# Patient Record
Sex: FEMALE | Race: WHITE | NOT HISPANIC OR LATINO | Employment: UNEMPLOYED | URBAN - NONMETROPOLITAN AREA
[De-identification: names, ages, dates, MRNs, and addresses within clinical notes are randomized per-mention and may not be internally consistent; named-entity substitution may affect disease eponyms.]

---

## 2019-01-18 ENCOUNTER — TRANSCRIBE ORDERS (OUTPATIENT)
Dept: ORTHOPEDIC SURGERY | Facility: CLINIC | Age: 19
End: 2019-01-18

## 2019-01-18 DIAGNOSIS — M41.9 SCOLIOSIS, UNSPECIFIED SCOLIOSIS TYPE, UNSPECIFIED SPINAL REGION: Primary | ICD-10-CM

## 2019-01-28 ENCOUNTER — OFFICE VISIT (OUTPATIENT)
Dept: ORTHOPEDIC SURGERY | Facility: CLINIC | Age: 19
End: 2019-01-28

## 2019-01-28 VITALS — HEIGHT: 63 IN | BODY MASS INDEX: 39.51 KG/M2 | WEIGHT: 223 LBS

## 2019-01-28 DIAGNOSIS — M54.6 BACK PAIN OF THORACOLUMBAR REGION: Primary | ICD-10-CM

## 2019-01-28 DIAGNOSIS — M41.9 SCOLIOSIS, UNSPECIFIED SCOLIOSIS TYPE, UNSPECIFIED SPINAL REGION: Primary | ICD-10-CM

## 2019-01-28 DIAGNOSIS — M54.50 BACK PAIN OF THORACOLUMBAR REGION: Primary | ICD-10-CM

## 2019-01-28 DIAGNOSIS — M41.125 ADOLESCENT IDIOPATHIC SCOLIOSIS OF THORACOLUMBAR REGION: ICD-10-CM

## 2019-01-28 PROCEDURE — 99213 OFFICE O/P EST LOW 20 MIN: CPT | Performed by: ORTHOPAEDIC SURGERY

## 2019-01-28 RX ORDER — ALBUTEROL SULFATE 90 UG/1
AEROSOL, METERED RESPIRATORY (INHALATION)
COMMUNITY
Start: 2016-08-03

## 2019-01-28 RX ORDER — LORATADINE 10 MG/1
CAPSULE, LIQUID FILLED ORAL
COMMUNITY

## 2019-01-28 RX ORDER — IBUPROFEN 800 MG/1
TABLET ORAL
COMMUNITY
Start: 2016-07-29

## 2019-01-28 RX ORDER — ACETAMINOPHEN AND CODEINE PHOSPHATE 300; 30 MG/1; MG/1
TABLET ORAL
COMMUNITY
Start: 2016-07-29

## 2019-01-28 RX ORDER — ONDANSETRON 4 MG/1
4 TABLET, FILM COATED ORAL EVERY 8 HOURS PRN
COMMUNITY

## 2019-01-28 RX ORDER — MONTELUKAST SODIUM 10 MG/1
10 TABLET ORAL NIGHTLY
COMMUNITY

## 2019-01-28 RX ORDER — IRON POLYSACCHARIDE COMPLEX 150 MG
150 CAPSULE ORAL 2 TIMES DAILY
COMMUNITY

## 2019-01-28 RX ORDER — VENLAFAXINE 37.5 MG/1
37.5 TABLET ORAL 2 TIMES DAILY
COMMUNITY

## 2019-01-28 RX ORDER — BUDESONIDE AND FORMOTEROL FUMARATE DIHYDRATE 160; 4.5 UG/1; UG/1
2 AEROSOL RESPIRATORY (INHALATION)
COMMUNITY

## 2019-01-28 RX ORDER — OMEPRAZOLE 20 MG/1
20 CAPSULE, DELAYED RELEASE ORAL DAILY
COMMUNITY

## 2019-01-28 RX ORDER — TIZANIDINE 4 MG/1
4 TABLET ORAL NIGHTLY PRN
COMMUNITY

## 2019-01-28 NOTE — PROGRESS NOTES
Loretta Santana is a 18 y.o. female   Primary provider:  Geremias Foreman MD       Chief Complaint   Patient presents with   • Lumbar Spine - Pain   • Thoracic Spine - Pain     X-rays done today in office    HISTORY OF PRESENT ILLNESS: back pain from scoliosis.  Patient states the pain radiating from tailbone up to shoulder blades.   Pain scale today 4/10    Pain   This is a chronic problem. The current episode started more than 1 year ago. The problem occurs constantly. The problem has been gradually worsening. Associated symptoms include headaches and vomiting. Associated symptoms comments: Stabbing,aching pain . The symptoms are aggravated by walking and standing. She has tried ice (injections) for the symptoms. The treatment provided no relief.     18 y pain of the back present for several months.  The pain is moderate, states she was referred to physical therapy.  Previously treated in a brace as adolescent for scoliosis, now requesting a brace  No fevers, no chills   no nighttime awakening pain.         CONCURRENT MEDICAL HISTORY:    Past Medical History:   Diagnosis Date   • Adolescent idiopathic scoliosis of thoracolumbar region 1/28/2019   • Anemia    • Asthma    • Depression        No Known Allergies      Current Outpatient Medications:   •  acetaminophen-codeine (TYLENOL #3) 300-30 MG per tablet, , Disp: , Rfl:   •  albuterol sulfate HFA (VENTOLIN HFA) 108 (90 Base) MCG/ACT inhaler, , Disp: , Rfl:   •  ibuprofen (ADVIL,MOTRIN) 800 MG tablet, , Disp: , Rfl:   •  iron polysaccharides (NIFEREX) 150 MG capsule, Take 150 mg by mouth 2 (Two) Times a Day., Disp: , Rfl:   •  Lido-Capsaicin-Men-Methyl Sal (1ST MEDX-PATCH/ LIDOCAINE) 4-0.025-5-20 % patch, Apply  topically., Disp: , Rfl:   •  Loratadine 10 MG capsule, Take  by mouth., Disp: , Rfl:   •  metFORMIN (GLUCOPHAGE) 500 MG tablet, Take 500 mg by mouth 2 (Two) Times a Day With Meals., Disp: , Rfl:   •  montelukast (SINGULAIR) 10 MG tablet, Take 10 mg by mouth  Every Night., Disp: , Rfl:   •  Norgestimate-Eth Estradiol (SPRINTEC 28 PO), Take  by mouth., Disp: , Rfl:   •  omeprazole (priLOSEC) 20 MG capsule, Take 20 mg by mouth Daily., Disp: , Rfl:   •  ondansetron (ZOFRAN) 4 MG tablet, Take 4 mg by mouth Every 8 (Eight) Hours As Needed for Nausea or Vomiting., Disp: , Rfl:   •  tiZANidine (ZANAFLEX) 4 MG tablet, Take 4 mg by mouth At Night As Needed for Muscle Spasms., Disp: , Rfl:   •  venlafaxine (EFFEXOR) 37.5 MG tablet, Take 37.5 mg by mouth 2 (Two) Times a Day., Disp: , Rfl:   •  budesonide-formoterol (SYMBICORT) 160-4.5 MCG/ACT inhaler, Inhale 2 puffs., Disp: , Rfl:     No past surgical history on file.    No family history on file.     Social History     Socioeconomic History   • Marital status: Single     Spouse name: Not on file   • Number of children: Not on file   • Years of education: Not on file   • Highest education level: Not on file   Social Needs   • Financial resource strain: Not on file   • Food insecurity - worry: Not on file   • Food insecurity - inability: Not on file   • Transportation needs - medical: Not on file   • Transportation needs - non-medical: Not on file   Occupational History   • Not on file   Tobacco Use   • Smoking status: Never Smoker   • Smokeless tobacco: Never Used   Substance and Sexual Activity   • Alcohol use: No     Frequency: Never   • Drug use: No   • Sexual activity: Defer   Other Topics Concern   • Not on file   Social History Narrative   • Not on file        Review of Systems   Constitutional: Negative.    Eyes: Negative.    Respiratory: Positive for wheezing.    Cardiovascular: Negative.    Gastrointestinal: Positive for vomiting.   Endocrine: Negative.    Genitourinary: Negative.    Musculoskeletal: Positive for back pain.   Skin: Negative.    Allergic/Immunologic: Negative.    Neurological: Positive for dizziness and headaches.   Hematological: Negative.    Psychiatric/Behavioral: Positive for sleep disturbance. The  "patient is nervous/anxious.        PHYSICAL EXAMINATION:       Ht 160 cm (63\")   Wt 101 kg (223 lb)   BMI 39.50 kg/m²     Physical Exam   Constitutional: She is oriented to person, place, and time. She appears well-developed and well-nourished. No distress.   obese   HENT:   Head: Normocephalic.   Right Ear: External ear normal.   Left Ear: External ear normal.   Mouth/Throat: No oropharyngeal exudate.   Eyes: EOM are normal. Pupils are equal, round, and reactive to light. Right eye exhibits no discharge. Left eye exhibits no discharge. No scleral icterus.   Neck: Normal range of motion. No JVD present. No tracheal deviation present. No thyromegaly present.   Cardiovascular: Normal rate and intact distal pulses.   Pulmonary/Chest: Effort normal and breath sounds normal. No respiratory distress. She has no wheezes. She has no rales. She exhibits no tenderness.   Abdominal: Soft. Bowel sounds are normal. She exhibits no distension and no mass. There is no tenderness. There is no guarding.   Musculoskeletal: Normal range of motion. She exhibits no edema or deformity.        Thoracic back: She exhibits normal range of motion, no tenderness, no bony tenderness, no swelling, no edema, no deformity, no laceration, no pain, no spasm and normal pulse.        Lumbar back: Normal. She exhibits normal range of motion, no tenderness, no bony tenderness, no swelling, no edema, no deformity, no laceration, no pain, no spasm and normal pulse.   Lymphadenopathy:     She has no cervical adenopathy.   Neurological: She is alert and oriented to person, place, and time. She has normal reflexes. She displays normal reflexes. No cranial nerve deficit. She exhibits normal muscle tone. Coordination normal.   Skin: Skin is warm and dry. No rash noted. She is not diaphoretic. No erythema.   Psychiatric: She has a normal mood and affect. Her behavior is normal. Thought content normal.       GAIT:     []  Normal  []  Antalgic    Assistive " device: [x]  None  []  Walker     []  Crutches  []  Cane     []  Wheelchair  []  Stretcher    Right Ankle Exam     Muscle Strength   Dorsiflexion:  5/5  Plantar flexion:  5/5  Anterior tibial:  5/5  Posterior tibial:  5/5  Gastrocsoleus:  5/5  Peroneal muscle:  5/5      Left Ankle Exam     Muscle Strength   Dorsiflexion:  5/5   Plantar flexion:  5/5   Anterior tibial:  5/5   Posterior tibial:  5/5  Gastrocsoleus:  5/5  Peroneal muscle:  5/5      Right Hip Exam     Range of Motion   External rotation: normal   Internal rotation: normal     Muscle Strength   Abduction: 5/5   Flexion: 5/5       Left Hip Exam     Range of Motion   External rotation: normal   Internal rotation: normal     Muscle Strength   Abduction: 5/5   Flexion: 5/5       Back Exam     Tenderness   The patient is experiencing tenderness in the lumbar.    Range of Motion   Extension: 30   Flexion:  70 normal   Lateral bend right: 20   Lateral bend left: 20   Rotation right: 30   Rotation left: 30     Muscle Strength   Right Quadriceps:  5/5   Left Quadriceps:  5/5   Right Hamstrings:  5/5   Left Hamstrings:  5/5     Tests   Straight leg raise right: negative  Straight leg raise left: negative    Reflexes   Patellar: 2/4  Achilles: 2/4  Biceps: 2/4  Babinski's sign: normal     Other   Sensation: normal  Gait: normal   Erythema: no back redness  Scars: absent        right thoracic prominence        ASSESSMENT:    Diagnoses and all orders for this visit:    Back pain of thoracolumbar region  -     Back Brace    Adolescent idiopathic scoliosis of thoracolumbar region  -     Back Brace    Other orders  -     budesonide-formoterol (SYMBICORT) 160-4.5 MCG/ACT inhaler; Inhale 2 puffs.  -     albuterol sulfate HFA (VENTOLIN HFA) 108 (90 Base) MCG/ACT inhaler;   -     acetaminophen-codeine (TYLENOL #3) 300-30 MG per tablet;   -     ibuprofen (ADVIL,MOTRIN) 800 MG tablet;   -     Norgestimate-Eth Estradiol (SPRINTEC 28 PO); Take  by mouth.  -     Loratadine 10 MG  capsule; Take  by mouth.  -     montelukast (SINGULAIR) 10 MG tablet; Take 10 mg by mouth Every Night.  -     omeprazole (priLOSEC) 20 MG capsule; Take 20 mg by mouth Daily.  -     venlafaxine (EFFEXOR) 37.5 MG tablet; Take 37.5 mg by mouth 2 (Two) Times a Day.  -     metFORMIN (GLUCOPHAGE) 500 MG tablet; Take 500 mg by mouth 2 (Two) Times a Day With Meals.  -     iron polysaccharides (NIFEREX) 150 MG capsule; Take 150 mg by mouth 2 (Two) Times a Day.  -     tiZANidine (ZANAFLEX) 4 MG tablet; Take 4 mg by mouth At Night As Needed for Muscle Spasms.  -     Lido-Capsaicin-Men-Methyl Sal (1ST MEDX-PATCH/ LIDOCAINE) 4-0.025-5-20 % patch; Apply  topically.  -     ondansetron (ZOFRAN) 4 MG tablet; Take 4 mg by mouth Every 8 (Eight) Hours As Needed for Nausea or Vomiting.          PLAN    Brace ordered lumbar region   I encouraged her to go physical therapy.        Maurice Stephens MD

## 2021-09-13 ENCOUNTER — TELEPHONE (OUTPATIENT)
Dept: CARDIOLOGY CLINIC | Age: 21
End: 2021-09-13

## 2021-09-13 NOTE — TELEPHONE ENCOUNTER
Pt.'s mom called to reschedule a appt. she had for today. Pt. is having trouble with her asthma. Due to covid questions I'm unable to reschedule in office. .     Please be advised that the best time to call her to accommodate their needs is Anytime. Thank you.

## 2021-09-13 NOTE — TELEPHONE ENCOUNTER
Called and left message for patient. If patient calls back to reschedule please send to [phone] to reschedule.

## 2021-09-14 NOTE — TELEPHONE ENCOUNTER
Called and left message for patient. If patient calls back to reschedule please send to [phone] to reschedule.

## 2021-09-15 NOTE — TELEPHONE ENCOUNTER
Called and left message for patient. If patient calls back to reschedule please send to [phone] to reschedule.

## 2023-10-24 ENCOUNTER — OFFICE VISIT (OUTPATIENT)
Dept: CARDIOLOGY CLINIC | Age: 23
End: 2023-10-24
Payer: MEDICAID

## 2023-10-24 VITALS
WEIGHT: 227 LBS | BODY MASS INDEX: 40.22 KG/M2 | HEART RATE: 128 BPM | SYSTOLIC BLOOD PRESSURE: 148 MMHG | HEIGHT: 63 IN | DIASTOLIC BLOOD PRESSURE: 100 MMHG

## 2023-10-24 DIAGNOSIS — R42 DIZZINESS: ICD-10-CM

## 2023-10-24 DIAGNOSIS — R00.2 PALPITATIONS: Primary | ICD-10-CM

## 2023-10-24 DIAGNOSIS — R94.31 ABNORMAL EKG: ICD-10-CM

## 2023-10-24 PROCEDURE — 93000 ELECTROCARDIOGRAM COMPLETE: CPT | Performed by: INTERNAL MEDICINE

## 2023-10-24 PROCEDURE — 99203 OFFICE O/P NEW LOW 30 MIN: CPT | Performed by: INTERNAL MEDICINE

## 2023-10-24 RX ORDER — LEVOCETIRIZINE DIHYDROCHLORIDE 5 MG/1
5 TABLET, FILM COATED ORAL NIGHTLY
COMMUNITY

## 2023-10-24 RX ORDER — FLUOXETINE HYDROCHLORIDE 40 MG/1
40 CAPSULE ORAL DAILY
COMMUNITY

## 2023-10-24 RX ORDER — BUSPIRONE HYDROCHLORIDE 15 MG/1
15 TABLET ORAL 2 TIMES DAILY
COMMUNITY

## 2023-10-24 RX ORDER — MONTELUKAST SODIUM 10 MG/1
10 TABLET ORAL NIGHTLY
COMMUNITY

## 2023-10-24 RX ORDER — METOCLOPRAMIDE 10 MG/1
10 TABLET ORAL DAILY
COMMUNITY

## 2023-10-24 RX ORDER — AMITRIPTYLINE HYDROCHLORIDE 25 MG/1
25 TABLET, FILM COATED ORAL NIGHTLY
COMMUNITY

## 2023-10-24 RX ORDER — LEVOTHYROXINE SODIUM 0.03 MG/1
25 TABLET ORAL DAILY
COMMUNITY

## 2023-10-24 RX ORDER — OMEPRAZOLE 10 MG/1
10 CAPSULE, DELAYED RELEASE ORAL DAILY
COMMUNITY

## 2023-10-24 RX ORDER — IRON POLYSACCHARIDE COMPLEX 150 MG
150 CAPSULE ORAL DAILY
COMMUNITY

## 2023-10-24 RX ORDER — TOPIRAMATE 50 MG/1
50 TABLET, FILM COATED ORAL DAILY
COMMUNITY

## 2023-10-24 ASSESSMENT — ENCOUNTER SYMPTOMS
FACIAL SWELLING: 0
VOMITING: 0
EYE PAIN: 0
CONSTIPATION: 0
ABDOMINAL DISTENTION: 0
NAUSEA: 0
CHEST TIGHTNESS: 0
EYE REDNESS: 0
BLOOD IN STOOL: 0
COUGH: 0
ABDOMINAL PAIN: 0
APNEA: 0
DIARRHEA: 0
SHORTNESS OF BREATH: 0
SORE THROAT: 0
WHEEZING: 0
EYE DISCHARGE: 0

## 2023-10-24 NOTE — PROGRESS NOTES
distension. Palpations: Abdomen is soft. There is no mass. Tenderness: There is no abdominal tenderness. There is no guarding. Musculoskeletal:         General: No swelling. Cervical back: Normal range of motion and neck supple. No rigidity. Right lower leg: No edema. Left lower leg: No edema. Skin:     General: Skin is warm and dry. Coloration: Skin is not jaundiced. Findings: No erythema or rash. Neurological:      General: No focal deficit present. Mental Status: She is alert and oriented to person, place, and time. Mental status is at baseline. Psychiatric:         Mood and Affect: Mood normal.         Behavior: Behavior normal.         Thought Content: Thought content normal.                   Imaging:    - EKG : Sinus tachycardia 109 bpm, RSR pattern in V1, nonspecific ST changes        ASSESSMENT and PLAN:    Constellation of symptoms including palpitations, tachycardia and dizziness, etiology unclear  Sinus tachycardia on EKG  Negative orthostatic vitals  Recommend tilt table test for possibility of postural orthostatic tachycardia syndrome  Obtain 2D echocardiogram with Doppler to exclude significant structural heart disease  Further recommendations will follow                  Electronically signed by Reuben Marinelli MD on 10/24/2023 at 10:58 AM    Reuben Marinelli MD, URMILA, [facility]  Noninvasive Cardiology Consultant    This dictation was generated by voice recognition computer software. Although all attempts are made to edit the dictation for accuracy, there may be errors in the transcription that are not intended.

## 2023-11-02 ENCOUNTER — HOSPITAL ENCOUNTER (OUTPATIENT)
Dept: NON INVASIVE DIAGNOSTICS | Age: 23
Discharge: HOME | End: 2023-11-02
Payer: MEDICAID

## 2023-11-02 DIAGNOSIS — R42 DIZZINESS: ICD-10-CM

## 2023-11-02 DIAGNOSIS — R00.2 PALPITATIONS: ICD-10-CM

## 2023-11-02 DIAGNOSIS — R94.31 ABNORMAL EKG: ICD-10-CM

## 2023-11-02 PROCEDURE — 93660 TILT TABLE EVALUATION: CPT

## 2023-11-02 NOTE — PROCEDURES
[address]      Tilt Table Test Report      Date of Procedure: 11/02/23    Date of Report: 11/02/23      The patient was brought to the [id number] [place] [address],[address]. Heart rate and blood pressure were obtained at rest (horizontal) and then every minute during 45 minutes of heads up tilt table testing at 80 degrees. The patient was then recovered. Results: At rest the heart rate was:  95 bpm and the blood pressure was:  138/78 mmHg    During 43:30 minutes of upright tilt table testing, the heart rate varied between 101 bpm and 131 bpm.    During 43:30 minutes of upright tilt table testing, the blood pressure varied between 132/101 mmHg and 195/59 mmHg. At the completion of the test, the heart rate was:  98 bpm and the blood pressure was:  151/89 mmHg    During the 43:30 minutes of up right tilt table testing, the patient experienced dizziness nausea and warm feeling. Requested pt be put down at 43:30 d/t feeling so nauseated. Complications:  None      Conclusions: The 43:30minutes of heads up right tilt table testing showed:    1. Mild symptoms of pre syncope    2. 65 point variations in blood pressure    3. 30 beat variations in heart rate    4. Pt was  without arrhythmia or pauses. Impression: This is a positive tilt as patient did have symptoms I did blood pressure of 132/101   and heart rate of 116 bpm with more than 65 point variation blood pressure and 30 beat variation in heart rate,   These are significant findings.     Electronically signed by Juwan Stallworth RN on 11/2/23

## 2024-01-24 ENCOUNTER — TELEPHONE (OUTPATIENT)
Dept: CARDIOLOGY CLINIC | Age: 24
End: 2024-01-24

## 2024-01-24 NOTE — TELEPHONE ENCOUNTER
Called and left message for patient letting them know Dr. Mejia will be out of the office. Can reschedule to see IRMA or Dr. Bob next opening.

## 2024-03-08 ENCOUNTER — OFFICE VISIT (OUTPATIENT)
Dept: GASTROENTEROLOGY | Age: 24
End: 2024-03-08
Payer: MEDICAID

## 2024-03-08 VITALS
SYSTOLIC BLOOD PRESSURE: 138 MMHG | WEIGHT: 218 LBS | HEIGHT: 62 IN | DIASTOLIC BLOOD PRESSURE: 84 MMHG | BODY MASS INDEX: 40.12 KG/M2 | HEART RATE: 102 BPM | OXYGEN SATURATION: 97 %

## 2024-03-08 DIAGNOSIS — R11.2 NAUSEA AND VOMITING, UNSPECIFIED VOMITING TYPE: Primary | ICD-10-CM

## 2024-03-08 DIAGNOSIS — R10.13 EPIGASTRIC PAIN: ICD-10-CM

## 2024-03-08 PROCEDURE — 99204 OFFICE O/P NEW MOD 45 MIN: CPT | Performed by: NURSE PRACTITIONER

## 2024-03-08 RX ORDER — PROMETHAZINE HYDROCHLORIDE 25 MG/1
25 TABLET ORAL EVERY 6 HOURS PRN
COMMUNITY
Start: 2024-01-26

## 2024-03-08 RX ORDER — ACETAMINOPHEN AND CODEINE PHOSPHATE 300; 30 MG/1; MG/1
1 TABLET ORAL EVERY 4 HOURS PRN
COMMUNITY
Start: 2016-07-29

## 2024-03-08 RX ORDER — TIZANIDINE 4 MG/1
4 TABLET ORAL NIGHTLY PRN
COMMUNITY

## 2024-03-08 RX ORDER — RIZATRIPTAN BENZOATE 10 MG/1
10 TABLET, ORALLY DISINTEGRATING ORAL
COMMUNITY
Start: 2024-03-04

## 2024-03-08 RX ORDER — ONDANSETRON 4 MG/1
4 TABLET, FILM COATED ORAL EVERY 8 HOURS PRN
COMMUNITY

## 2024-03-08 RX ORDER — ERGOCALCIFEROL 1.25 MG/1
50000 CAPSULE ORAL WEEKLY
COMMUNITY

## 2024-03-08 RX ORDER — BETHANECHOL CHLORIDE 25 MG/1
25 TABLET ORAL 4 TIMES DAILY
Qty: 120 TABLET | Refills: 11 | Status: SHIPPED | OUTPATIENT
Start: 2024-03-08

## 2024-03-08 RX ORDER — POTASSIUM CHLORIDE 750 MG/1
10 TABLET, FILM COATED, EXTENDED RELEASE ORAL DAILY
COMMUNITY
Start: 2024-03-02

## 2024-03-08 RX ORDER — FLUTICASONE PROPIONATE AND SALMETEROL XINAFOATE 115; 21 UG/1; UG/1
2 AEROSOL, METERED RESPIRATORY (INHALATION) 2 TIMES DAILY
COMMUNITY
Start: 2016-08-03

## 2024-03-08 RX ORDER — PROCHLORPERAZINE 25 MG
25 SUPPOSITORY, RECTAL RECTAL EVERY 12 HOURS PRN
COMMUNITY

## 2024-03-08 RX ORDER — IBUPROFEN 800 MG/1
800 TABLET ORAL EVERY 6 HOURS PRN
COMMUNITY
Start: 2016-07-29

## 2024-03-08 RX ORDER — PROCHLORPERAZINE MALEATE 10 MG
10 TABLET ORAL EVERY 6 HOURS PRN
COMMUNITY
Start: 2024-03-05

## 2024-03-08 NOTE — PROGRESS NOTES
Subjective:     Patient ID: Vickie Whitley is a 23 y.o. female  PCP: Hipolito Hart MD  Referring Provider: Rito Hart  Patient presents to the office today with the following complaints: New Patient, Other (Gastroparesis thinks she has ), Nausea, and Emesis      Patient seen in the office today with complaints of nausea, vomiting,   And abd pain that gets worse when she drinks   When she eats she vomits \"almost instantly\"   Weight loss of 25 pounds since December   Diagnosed with POTS in November   Zofran and phenergan do not help her nausea   Compazine helps slightly     She went to the ER Friday at [facility]   CT scan was done     She had these symptoms last year and had her gallbladder removed but it did not help the symptoms    Reports she was prescribed Reglan for her gastroparesis but she does not feel this is helping her, she has not had any gastric emptying studies done   Reports she is having loose stools, but states she is not eating solid foods either     EGD was done 1/2023 at [facility]     Assessment:     1. Nausea and vomiting, unspecified vomiting type  -     NM GASTRIC EMPTYING; Future  -     bethanechol (URECHOLINE) 25 MG tablet; Take 1 tablet by mouth 4 times daily Take 30 minutes before each meal and at bedtime, Disp-120 tablet, R-11Normal  2. Epigastric pain  -     NM GASTRIC EMPTYING; Future  -     bethanechol (URECHOLINE) 25 MG tablet; Take 1 tablet by mouth 4 times daily Take 30 minutes before each meal and at bedtime, Disp-120 tablet, R-11Normal       Review of Systems   Constitutional:  Negative for activity change, appetite change, fatigue, fever and unexpected weight change.   HENT:  Negative for trouble swallowing.    Respiratory:  Negative for cough, choking and shortness of breath.    Cardiovascular:  Negative for chest pain.   Gastrointestinal:  Positive for abdominal pain (epigastric), nausea and vomiting. Negative for abdominal

## 2024-03-12 ASSESSMENT — ENCOUNTER SYMPTOMS
DIARRHEA: 0
ANAL BLEEDING: 0
CHOKING: 0
BLOOD IN STOOL: 0
NAUSEA: 1
ABDOMINAL PAIN: 1
ABDOMINAL DISTENTION: 0
RECTAL PAIN: 0
TROUBLE SWALLOWING: 0
CONSTIPATION: 0
COUGH: 0
VOMITING: 1
SHORTNESS OF BREATH: 0

## 2024-03-20 ENCOUNTER — TELEPHONE (OUTPATIENT)
Dept: GASTROENTEROLOGY | Age: 24
End: 2024-03-20

## 2024-03-20 NOTE — TELEPHONE ENCOUNTER
Per OV 03- CT ARPN     Plan:   Stop Reglan  Start bethanechol   Gastric emptying study   Follow up in 6 weeks        03- Patient called LVM stated that the Dr has her on medication that she is unable to take as that it makes her really sick to eat or drink       Called patient   She has started Bethanechol-since starting it she has nausea & Vomiting (1-2 times daily).        Recommend to send message to CT APRN for recommendations.     Oked per patient

## 2024-03-21 NOTE — TELEPHONE ENCOUNTER
03- Called M for the patient to return a call to the office Cv        03- Pt returned call  Notified of recommendations as per CT APRn

## 2024-03-21 NOTE — TELEPHONE ENCOUNTER
She can stop the bethanechol. I do not have another medication I can give her for gastroparesis  minimum assist (75% patients effort)

## 2024-05-01 ENCOUNTER — TELEPHONE (OUTPATIENT)
Dept: GASTROENTEROLOGY | Age: 24
End: 2024-05-01

## 2024-05-02 NOTE — TELEPHONE ENCOUNTER
5-2-24  NM GASTRIC EMPTYING RECEIVED AND SCANNED IN MEDIA.  SENT TO CT APRN FOR REVIEW AND ADVISE.

## 2024-05-02 NOTE — TELEPHONE ENCOUNTER
WE DO NOT HAVE THAT REPORT.  CALLED [facility] [phone], RESULTS ARE BEING FAXED TO ME.    PT KNOWS ONCE THEY ARE RECEIVED I WILL CALL HER BACK W/RESULTS.  PT VOICED UNDERSTANDING.

## 2024-05-02 NOTE — RESULT ENCOUNTER NOTE
Mild gastroparesis noted, please instruct her to eat small meals and I have already prescribed her medication to help with gastric emptying (Bethanechol)

## 2024-05-03 ENCOUNTER — TELEPHONE (OUTPATIENT)
Dept: GASTROENTEROLOGY | Age: 24
End: 2024-05-03

## 2024-05-03 NOTE — TELEPHONE ENCOUNTER
5-3-24    Thanks Vidal,  pt has been notified of results and recommendations. Pt can not take Bethanechol due to it making her Vomit 2x a day. She is back on Reglan       Results routed to PCP

## 2024-05-03 NOTE — TELEPHONE ENCOUNTER
----- Message from IRMA Vargas sent at 5/2/2024  2:58 PM CDT -----  Mild gastroparesis noted, please instruct her to eat small meals and I have already prescribed her medication to help with gastric emptying (Bethanechol)

## 2024-05-14 ENCOUNTER — TELEPHONE (OUTPATIENT)
Dept: CARDIOLOGY CLINIC | Age: 24
End: 2024-05-14

## 2024-05-14 NOTE — TELEPHONE ENCOUNTER
Called and left VM for patient to return call to office. Needs to R/S Roberto appt. May R/S with a NP. 5/14/24 AH

## 2024-05-16 ENCOUNTER — TELEPHONE (OUTPATIENT)
Dept: CARDIOLOGY CLINIC | Age: 24
End: 2024-05-16

## 2024-05-16 NOTE — TELEPHONE ENCOUNTER
I called the patient for the 3rd time and left a VM stating that I will be cancelling the appt off today. They may R/S with a NP. 5/16/4 AH